# Patient Record
Sex: MALE | Race: WHITE | URBAN - METROPOLITAN AREA
[De-identification: names, ages, dates, MRNs, and addresses within clinical notes are randomized per-mention and may not be internally consistent; named-entity substitution may affect disease eponyms.]

---

## 2023-01-10 ENCOUNTER — OFFICE VISIT (OUTPATIENT)
Dept: ENDOCRINOLOGY | Age: 68
End: 2023-01-10
Payer: MEDICARE

## 2023-01-10 VITALS
HEIGHT: 74 IN | WEIGHT: 248 LBS | HEART RATE: 84 BPM | OXYGEN SATURATION: 95 % | DIASTOLIC BLOOD PRESSURE: 86 MMHG | SYSTOLIC BLOOD PRESSURE: 118 MMHG | BODY MASS INDEX: 31.83 KG/M2

## 2023-01-10 DIAGNOSIS — D35.2 PITUITARY MACROADENOMA WITH EXTRASELLAR EXTENSION (HCC): Primary | ICD-10-CM

## 2023-01-10 DIAGNOSIS — E22.1 HYPERPROLACTINEMIA (HCC): ICD-10-CM

## 2023-01-10 PROBLEM — I10 ESSENTIAL HYPERTENSION: Status: ACTIVE | Noted: 2023-01-10

## 2023-01-10 PROBLEM — E78.1 HYPERTRIGLYCERIDEMIA: Status: ACTIVE | Noted: 2023-01-10

## 2023-01-10 PROCEDURE — G8484 FLU IMMUNIZE NO ADMIN: HCPCS | Performed by: INTERNAL MEDICINE

## 2023-01-10 PROCEDURE — 1123F ACP DISCUSS/DSCN MKR DOCD: CPT | Performed by: INTERNAL MEDICINE

## 2023-01-10 PROCEDURE — 99204 OFFICE O/P NEW MOD 45 MIN: CPT | Performed by: INTERNAL MEDICINE

## 2023-01-10 PROCEDURE — 3017F COLORECTAL CA SCREEN DOC REV: CPT | Performed by: INTERNAL MEDICINE

## 2023-01-10 PROCEDURE — G8417 CALC BMI ABV UP PARAM F/U: HCPCS | Performed by: INTERNAL MEDICINE

## 2023-01-10 PROCEDURE — G8427 DOCREV CUR MEDS BY ELIG CLIN: HCPCS | Performed by: INTERNAL MEDICINE

## 2023-01-10 PROCEDURE — 3079F DIAST BP 80-89 MM HG: CPT | Performed by: INTERNAL MEDICINE

## 2023-01-10 PROCEDURE — 4004F PT TOBACCO SCREEN RCVD TLK: CPT | Performed by: INTERNAL MEDICINE

## 2023-01-10 PROCEDURE — 3074F SYST BP LT 130 MM HG: CPT | Performed by: INTERNAL MEDICINE

## 2023-01-10 RX ORDER — AZELASTINE 1 MG/ML
2 SPRAY, METERED NASAL 2 TIMES DAILY
COMMUNITY
Start: 2021-06-04

## 2023-01-10 RX ORDER — CLONIDINE HYDROCHLORIDE 0.1 MG/1
TABLET ORAL
COMMUNITY
Start: 2022-12-19

## 2023-01-10 RX ORDER — LORAZEPAM 1 MG/1
0.5 TABLET ORAL NIGHTLY
COMMUNITY
Start: 2022-09-01

## 2023-01-10 RX ORDER — HYDROCHLOROTHIAZIDE 25 MG/1
TABLET ORAL
COMMUNITY
Start: 2022-11-22

## 2023-01-10 RX ORDER — VITAMIN B COMPLEX
1 CAPSULE ORAL DAILY
COMMUNITY

## 2023-01-10 RX ORDER — TRAZODONE HYDROCHLORIDE 50 MG/1
TABLET ORAL
COMMUNITY
Start: 2022-11-22

## 2023-01-10 RX ORDER — AZITHROMYCIN 250 MG/1
TABLET, FILM COATED ORAL
COMMUNITY
Start: 2023-01-07

## 2023-01-10 RX ORDER — LEVOCETIRIZINE DIHYDROCHLORIDE 5 MG/1
5 TABLET, FILM COATED ORAL DAILY
COMMUNITY

## 2023-01-10 RX ORDER — ACETAMINOPHEN 325 MG/1
650 TABLET ORAL
COMMUNITY
Start: 2022-12-07

## 2023-01-10 RX ORDER — PRAVASTATIN SODIUM 40 MG
TABLET ORAL
COMMUNITY
Start: 2022-12-02

## 2023-01-10 RX ORDER — POLYETHYLENE GLYCOL 3350 17 G/17G
17 POWDER, FOR SOLUTION ORAL DAILY PRN
COMMUNITY
Start: 2022-12-07

## 2023-01-10 RX ORDER — NALOXONE HYDROCHLORIDE 4 MG/.1ML
SPRAY NASAL
COMMUNITY
Start: 2022-12-14

## 2023-01-10 RX ORDER — PHENOL 1.4 %
2.5 AEROSOL, SPRAY (ML) MUCOUS MEMBRANE DAILY
COMMUNITY

## 2023-01-10 RX ORDER — CEFDINIR 300 MG/1
CAPSULE ORAL
COMMUNITY
Start: 2023-01-07

## 2023-01-10 ASSESSMENT — ENCOUNTER SYMPTOMS
RHINORRHEA: 0
DIARRHEA: 0
CONSTIPATION: 0

## 2023-01-10 ASSESSMENT — PATIENT HEALTH QUESTIONNAIRE - PHQ9
SUM OF ALL RESPONSES TO PHQ QUESTIONS 1-9: 0
2. FEELING DOWN, DEPRESSED OR HOPELESS: 0
SUM OF ALL RESPONSES TO PHQ9 QUESTIONS 1 & 2: 0
SUM OF ALL RESPONSES TO PHQ QUESTIONS 1-9: 0
1. LITTLE INTEREST OR PLEASURE IN DOING THINGS: 0

## 2023-01-10 NOTE — PROGRESS NOTES
Elfego Tinsley MD, [address] [place] [address]            Reason for visit: Catie Mitchell is referred by Odilon Pimentel MD for the evaluation and management of a prolactinoma. ASSESSMENT AND PLAN:    1. Pituitary macroadenoma with extrasellar extension [facility])  Thank you for referring Mr. Yohana Palm for evaluation. He was initially referred to the [facility] endocrinology group back in September 2022 but cannot be seen in that office until May 2023. He self-referred to [address] and saw Dr. Randa Greene. He had mild hyperprolactinemia, almost certainly due to stalk effect (i.e. he almost certainly did not have a prolactinoma). He is now status post transsphenoidal pituitary resection 12/5/2022. He feels that he is recovered well and feels better since surgery. He believes that visual field deficits have resolved. I will have him check morning fasting labs at his convenience. He is scheduled for repeat MRI and visual field assessment. Return in 6 months.  - TSH; Future  - T4, Free; Future  - Prolactin; Future  - Insulin-Like Growth Factor; Future  - Testosterone Total Only, Male; Future  - Follicle Stimulating Hormone; Future  - Luteinizing Hormone; Future  - Cortisol, Baseline; Future  - ACTH; Future  - Comprehensive Metabolic Panel; Future    2. Hyperprolactinemia ([address]      Follow-up and Dispositions    Return in about 6 months (around 7/10/2023). History of Present Illness:    PITUITARY DISEASE  Catie Mitchell is here for new evaluation and treatment of a pituitary tumor. This was incidentally noted on MRI of the brain ordered as part of the evaluation of unilateral hearing loss. He has already been evaluated by Dr. Dann Stack (neurosurgeon) at [address] underwent transnasal/sublabral resection of the mass 12/5/2022. This was complicated by a CSF leak.     Symptoms: See review of systems below    Visual fields: Assessment by Dr. Logan Wilder before [facility] demonstrated a visual field deficit. He is scheduled for recheck 2023. Imagin2022: MRI brain with and without contrast ([facility])- 18 x 19 x 18 mm sellar and suprasellar mass displacing the posterior pituitary without evidence of extension in the sphenoid sinus. Tumor abuts the medial margin of the cavernous portion of the internal carotid arteries. There is mass affect upon the optic chiasm. 10/25/2022: MRI pituitary with and without contrast ([facility])- 21 x 19 x 18 mm sellar mass with mass-effect upon the optic chiasm and right with displacement of the infundibulum. Next MRI scheduled 2023 at [address]. Laboratory evaluation:  2020: TSH 1.109, free T4 0.76.  2021: TSH 1.326.  6/10/2022: TSH 0.893.  2022: Prolactin 16.5, IGF-I 73, human growth hormone less than 0.1, LH 2.6, FSH 3.4, cortisol 9.8, TSH 1.41, T4 7.4. Prior/current treatment:   Surgical: Transnasal/sublabral resection of the mass 2022 (Dr. Jessica García at [address])  Medical:   Prolactin: Cabergoline 0.5 mg twice weekly was started by Dr. Ana Blackburn 2022. This was stopped 2022. Adrenal: none  Thyroid: none  Gonadal: none  Growth hormone: none  ADH: none      Review of Systems   Constitutional:  Positive for unexpected weight change (lost ~10 pounds since TSPR 2022- he attributes to low appetite). Negative for fatigue. Libido is normal (improved since TSPR 2022). Mild erectile dysfunction treated with Cialis. HENT:  Negative for rhinorrhea. Eyes:  Negative for visual disturbance (improved). Cardiovascular:  Negative for palpitations. Gastrointestinal:  Negative for constipation and diarrhea. Endocrine: Negative for polydipsia and polyuria (urinates 4-5 times per day and 0-1 times overnight). Psychiatric/Behavioral:  Positive for agitation (increased irritability) and sleep disturbance. Negative for dysphoric mood. The patient is nervous/anxious.       /86 (Site: Left Upper Arm, Position: Sitting)   Pulse 84   Ht 6' 2\" (1.88 m)   Wt 248 lb (112.5 kg)   SpO2 95%   BMI 31.84 kg/m²   Wt Readings from Last 3 Encounters:   01/10/23 248 lb (112.5 kg)       Physical Exam  Constitutional:       Appearance: Normal appearance. HENT:      Head: Normocephalic. Neck:      Thyroid: No thyroid mass or thyromegaly. Cardiovascular:      Rate and Rhythm: Normal rate and regular rhythm. Pulmonary:      Effort: Pulmonary effort is normal.      Breath sounds: Normal breath sounds. Neurological:      Mental Status: He is alert.    Psychiatric:         Mood and Affect: Mood normal.         Behavior: Behavior normal.       Orders Placed This Encounter   Procedures    TSH     Standing Status:   Future     Standing Expiration Date:   1/10/2024    T4, Free     Standing Status:   Future     Standing Expiration Date:   1/10/2024    Prolactin     Standing Status:   Future     Standing Expiration Date:   1/10/2024    Insulin-Like Growth Factor     Standing Status:   Future     Standing Expiration Date:   1/10/2024    Testosterone Total Only, Male     Standing Status:   Future     Standing Expiration Date:   6/45/3258    Follicle Stimulating Hormone     Standing Status:   Future     Standing Expiration Date:   1/10/2024    Luteinizing Hormone     Standing Status:   Future     Standing Expiration Date:   1/10/2024    Cortisol, Baseline     Standing Status:   Future     Standing Expiration Date:   1/10/2024    ACTH     Standing Status:   Future     Standing Expiration Date:   1/10/2024    Comprehensive Metabolic Panel     Standing Status:   Future     Standing Expiration Date:   1/10/2024         Current Outpatient Medications   Medication Sig Dispense Refill    acetaminophen (TYLENOL) 325 MG tablet Take 650 mg by mouth      ascorbic acid (VITAMIN C) 1000 MG tablet Take by mouth      azelastine (ASTELIN) 0.1 % nasal spray 2 sprays by Nasal route 2 times daily      azithromycin (ZITHROMAX) 250 MG tablet       cloNIDine (CATAPRES) 0.1 MG tablet       Cholecalciferol (VITAMIN D3) 125 MCG (5000 UT) TABS Take 1 tablet by mouth daily      hydroCHLOROthiazide (HYDRODIURIL) 25 MG tablet       levocetirizine (XYZAL) 5 MG tablet Take 5 mg by mouth daily      LORazepam (ATIVAN) 1 MG tablet Take 0.5 mg by mouth nightly. naloxone 4 MG/0.1ML LIQD nasal spray       polyethylene glycol (GLYCOLAX) 17 GM/SCOOP powder Take 17 g by mouth daily as needed      pravastatin (PRAVACHOL) 40 MG tablet       traZODone (DESYREL) 50 MG tablet       silver sulfADIAZINE (SILVADENE) 1 % cream       Multiple Vitamins-Minerals (MULTIVITAMIN ADULTS PO) Take 1 tablet by mouth daily      b complex vitamins capsule Take 1 capsule by mouth daily      Melatonin 10 MG TABS Take 2.5 mg by mouth daily      cefdinir (OMNICEF) 300 MG capsule  (Patient not taking: Reported on 1/10/2023)       No current facility-administered medications for this visit. Linda Morocho MD, FACE      Portions of this note were generated with the assistance of voice recognition software. As such, some errors in transcription may be present.

## 2023-01-17 DIAGNOSIS — D35.2 PITUITARY MACROADENOMA WITH EXTRASELLAR EXTENSION (HCC): ICD-10-CM

## 2023-01-17 LAB
ALBUMIN SERPL-MCNC: 4.1 G/DL (ref 3.2–4.6)
ALBUMIN/GLOB SERPL: 1.2 (ref 0.4–1.6)
ALP SERPL-CCNC: 51 U/L (ref 50–136)
ALT SERPL-CCNC: 39 U/L (ref 12–65)
ANION GAP SERPL CALC-SCNC: 8 MMOL/L (ref 2–11)
AST SERPL-CCNC: 23 U/L (ref 15–37)
BILIRUB SERPL-MCNC: 1.2 MG/DL (ref 0.2–1.1)
BUN SERPL-MCNC: 14 MG/DL (ref 8–23)
CALCIUM SERPL-MCNC: 10.8 MG/DL (ref 8.3–10.4)
CHLORIDE SERPL-SCNC: 106 MMOL/L (ref 101–110)
CO2 SERPL-SCNC: 25 MMOL/L (ref 21–32)
CORTIS BS SERPL-MCNC: 21.5 UG/DL
CREAT SERPL-MCNC: 1.2 MG/DL (ref 0.8–1.5)
FSH SERPL-ACNC: 3.3 MIU/ML
GLOBULIN SER CALC-MCNC: 3.4 G/DL (ref 2.8–4.5)
GLUCOSE SERPL-MCNC: 112 MG/DL (ref 65–100)
LH SERPL-ACNC: 2 MIU/ML
POTASSIUM SERPL-SCNC: 4 MMOL/L (ref 3.5–5.1)
PROLACTIN SERPL-MCNC: 1.4 NG/ML
PROT SERPL-MCNC: 7.5 G/DL (ref 6.3–8.2)
SODIUM SERPL-SCNC: 139 MMOL/L (ref 133–143)
T4 FREE SERPL-MCNC: 1 NG/DL (ref 0.78–1.46)
TSH, 3RD GENERATION: 1.37 UIU/ML (ref 0.36–3.74)

## 2023-01-18 LAB
IGF-I SERPL-MCNC: 82 NG/ML (ref 59–230)
TESTOST SERPL-MCNC: 260 NG/DL (ref 264–916)

## 2023-01-19 DIAGNOSIS — D35.2 PITUITARY MACROADENOMA WITH EXTRASELLAR EXTENSION (HCC): Primary | ICD-10-CM

## 2023-01-19 DIAGNOSIS — E83.52 HYPERCALCEMIA: ICD-10-CM

## 2023-01-19 LAB — ACTH PLAS-MCNC: 74.2 PG/ML (ref 7.2–63.3)

## 2023-07-06 DIAGNOSIS — D35.2 PITUITARY MACROADENOMA WITH EXTRASELLAR EXTENSION (HCC): ICD-10-CM

## 2023-07-06 DIAGNOSIS — E83.52 HYPERCALCEMIA: ICD-10-CM

## 2023-07-06 LAB
25(OH)D3 SERPL-MCNC: 53.4 NG/ML (ref 30–100)
ALBUMIN SERPL-MCNC: 3.7 G/DL (ref 3.2–4.6)
ALBUMIN/GLOB SERPL: 1 (ref 0.4–1.6)
ALP SERPL-CCNC: 50 U/L (ref 50–136)
ALT SERPL-CCNC: 29 U/L (ref 12–65)
ANION GAP SERPL CALC-SCNC: 2 MMOL/L (ref 2–11)
AST SERPL-CCNC: 21 U/L (ref 15–37)
BILIRUB SERPL-MCNC: 0.9 MG/DL (ref 0.2–1.1)
BUN SERPL-MCNC: 16 MG/DL (ref 8–23)
CALCIUM SERPL-MCNC: 9.4 MG/DL (ref 8.3–10.4)
CALCIUM SERPL-MCNC: 9.6 MG/DL (ref 8.3–10.4)
CHLORIDE SERPL-SCNC: 109 MMOL/L (ref 101–110)
CO2 SERPL-SCNC: 26 MMOL/L (ref 21–32)
CREAT SERPL-MCNC: 1.2 MG/DL (ref 0.8–1.5)
GLOBULIN SER CALC-MCNC: 3.6 G/DL (ref 2.8–4.5)
GLUCOSE SERPL-MCNC: 108 MG/DL (ref 65–100)
POTASSIUM SERPL-SCNC: 3.8 MMOL/L (ref 3.5–5.1)
PROT SERPL-MCNC: 7.3 G/DL (ref 6.3–8.2)
PTH-INTACT SERPL-MCNC: 44 PG/ML (ref 18.5–88)
SODIUM SERPL-SCNC: 137 MMOL/L (ref 133–143)
T4 FREE SERPL-MCNC: 1 NG/DL (ref 0.78–1.46)
TSH, 3RD GENERATION: 1.62 UIU/ML (ref 0.36–3.74)

## 2023-07-14 ENCOUNTER — OFFICE VISIT (OUTPATIENT)
Dept: ENDOCRINOLOGY | Age: 68
End: 2023-07-14
Payer: MEDICARE

## 2023-07-14 VITALS
BODY MASS INDEX: 32.1 KG/M2 | WEIGHT: 250 LBS | OXYGEN SATURATION: 96 % | SYSTOLIC BLOOD PRESSURE: 112 MMHG | DIASTOLIC BLOOD PRESSURE: 63 MMHG | HEART RATE: 60 BPM

## 2023-07-14 DIAGNOSIS — R45.1 AGITATION: ICD-10-CM

## 2023-07-14 DIAGNOSIS — D35.2 PITUITARY MACROADENOMA WITH EXTRASELLAR EXTENSION (HCC): Primary | ICD-10-CM

## 2023-07-14 DIAGNOSIS — Z86.39 HISTORY OF HYPERPROLACTINEMIA: ICD-10-CM

## 2023-07-14 PROCEDURE — G8417 CALC BMI ABV UP PARAM F/U: HCPCS | Performed by: INTERNAL MEDICINE

## 2023-07-14 PROCEDURE — 3078F DIAST BP <80 MM HG: CPT | Performed by: INTERNAL MEDICINE

## 2023-07-14 PROCEDURE — 99214 OFFICE O/P EST MOD 30 MIN: CPT | Performed by: INTERNAL MEDICINE

## 2023-07-14 PROCEDURE — G8428 CUR MEDS NOT DOCUMENT: HCPCS | Performed by: INTERNAL MEDICINE

## 2023-07-14 PROCEDURE — 1123F ACP DISCUSS/DSCN MKR DOCD: CPT | Performed by: INTERNAL MEDICINE

## 2023-07-14 PROCEDURE — 3017F COLORECTAL CA SCREEN DOC REV: CPT | Performed by: INTERNAL MEDICINE

## 2023-07-14 PROCEDURE — 4004F PT TOBACCO SCREEN RCVD TLK: CPT | Performed by: INTERNAL MEDICINE

## 2023-07-14 PROCEDURE — 3074F SYST BP LT 130 MM HG: CPT | Performed by: INTERNAL MEDICINE

## 2023-07-14 NOTE — PROGRESS NOTES
Talia Sanches MD, [facility]            Reason for visit: Follow-up of a prolactinoma. ASSESSMENT AND PLAN:    1. Pituitary macroadenoma with extrasellar extension [facility])  Mr. Shawn Maurer is now status post transsphenoidal pituitary resection 2022. He feels that he is recovered well and feels better since surgery. He believes that visual field deficits have resolved (I have not received any reports about that). He had a mildly low testosterone (which is probably normal for his age) but no other hormonal perturbations after surgery. I will plan to follow labs as below. Return in 6 months with repeat labs. - Testosterone Total Only, Male; Future  - Comprehensive Metabolic Panel; Future  - Luteinizing Hormone; Future  - Follicle Stimulating Hormone; Future  - TSH; Future  - T4, Free; Future  - Insulin-Like Growth Factor; Future  - Cortisol, Baseline; Future    2. History of hyperprolactinemia    3. Agitation  He reports being more agitated recently. This is very unlikely to be due to an hormonal problem. I asked him to make sure that Dr. Jennie Aguilar is aware. Follow-up and Dispositions    Return in about 6 months (around 2024). History of Present Illness:    PITUITARY DISEASE  Gabriela Muñoz is here for new evaluation and treatment of a pituitary tumor. This was incidentally noted on MRI of the brain ordered as part of the evaluation of unilateral hearing loss. He has already been evaluated by Dr. Eleuterio Trotter (neurosurgeon) at [address] underwent transnasal/sublabral resection of the mass 2022. This was complicated by a CSF leak. Symptoms: See review of systems below    Visual fields: Assessment by Dr. Yuly Mazariegos before TSPR demonstrated a visual field deficit.   Assessments since then Joel Philippe and ~May 2023) have been normal.    Imagin2022: MRI brain with and without contrast ([facility])- 18 x 19 x 18 mm sellar and

## 2024-01-08 DIAGNOSIS — D35.2 PITUITARY MACROADENOMA WITH EXTRASELLAR EXTENSION (HCC): ICD-10-CM

## 2024-01-08 LAB
ALBUMIN SERPL-MCNC: 4.1 G/DL (ref 3.2–4.6)
ALBUMIN/GLOB SERPL: 1.2 (ref 0.4–1.6)
ALP SERPL-CCNC: 48 U/L (ref 50–136)
ALT SERPL-CCNC: 30 U/L (ref 12–65)
ANION GAP SERPL CALC-SCNC: 6 MMOL/L (ref 2–11)
AST SERPL-CCNC: 20 U/L (ref 15–37)
BILIRUB SERPL-MCNC: 1.3 MG/DL (ref 0.2–1.1)
BUN SERPL-MCNC: 23 MG/DL (ref 8–23)
CALCIUM SERPL-MCNC: 9.4 MG/DL (ref 8.3–10.4)
CHLORIDE SERPL-SCNC: 107 MMOL/L (ref 103–113)
CO2 SERPL-SCNC: 27 MMOL/L (ref 21–32)
CORTIS BS SERPL-MCNC: 19.8 UG/DL
CREAT SERPL-MCNC: 1.2 MG/DL (ref 0.8–1.5)
FSH SERPL-ACNC: 3.7 MIU/ML
GLOBULIN SER CALC-MCNC: 3.4 G/DL (ref 2.8–4.5)
GLUCOSE SERPL-MCNC: 108 MG/DL (ref 65–100)
LH SERPL-ACNC: 2.2 MIU/ML
POTASSIUM SERPL-SCNC: 4 MMOL/L (ref 3.5–5.1)
PROT SERPL-MCNC: 7.5 G/DL (ref 6.3–8.2)
SODIUM SERPL-SCNC: 140 MMOL/L (ref 136–146)
T4 FREE SERPL-MCNC: 1 NG/DL (ref 0.78–1.46)
TSH, 3RD GENERATION: 1.96 UIU/ML (ref 0.36–3.74)

## 2024-01-09 LAB — IGF-I SERPL-MCNC: 68 NG/ML (ref 59–230)

## 2024-01-10 LAB — TESTOST SERPL-MCNC: 285 NG/DL (ref 264–916)

## 2024-01-16 ENCOUNTER — OFFICE VISIT (OUTPATIENT)
Dept: ENDOCRINOLOGY | Age: 69
End: 2024-01-16
Payer: MEDICARE

## 2024-01-16 VITALS
HEART RATE: 59 BPM | OXYGEN SATURATION: 95 % | SYSTOLIC BLOOD PRESSURE: 112 MMHG | HEIGHT: 74 IN | DIASTOLIC BLOOD PRESSURE: 74 MMHG | BODY MASS INDEX: 32.29 KG/M2 | WEIGHT: 251.6 LBS

## 2024-01-16 DIAGNOSIS — R73.01 FASTING HYPERGLYCEMIA: ICD-10-CM

## 2024-01-16 DIAGNOSIS — D35.2 PITUITARY MACROADENOMA WITH EXTRASELLAR EXTENSION (HCC): Primary | ICD-10-CM

## 2024-01-16 DIAGNOSIS — Z86.39 HISTORY OF HYPERPROLACTINEMIA: ICD-10-CM

## 2024-01-16 PROCEDURE — 3017F COLORECTAL CA SCREEN DOC REV: CPT | Performed by: INTERNAL MEDICINE

## 2024-01-16 PROCEDURE — 3078F DIAST BP <80 MM HG: CPT | Performed by: INTERNAL MEDICINE

## 2024-01-16 PROCEDURE — G2211 COMPLEX E/M VISIT ADD ON: HCPCS | Performed by: INTERNAL MEDICINE

## 2024-01-16 PROCEDURE — G8484 FLU IMMUNIZE NO ADMIN: HCPCS | Performed by: INTERNAL MEDICINE

## 2024-01-16 PROCEDURE — G8417 CALC BMI ABV UP PARAM F/U: HCPCS | Performed by: INTERNAL MEDICINE

## 2024-01-16 PROCEDURE — G8427 DOCREV CUR MEDS BY ELIG CLIN: HCPCS | Performed by: INTERNAL MEDICINE

## 2024-01-16 PROCEDURE — 1123F ACP DISCUSS/DSCN MKR DOCD: CPT | Performed by: INTERNAL MEDICINE

## 2024-01-16 PROCEDURE — 4004F PT TOBACCO SCREEN RCVD TLK: CPT | Performed by: INTERNAL MEDICINE

## 2024-01-16 PROCEDURE — 3074F SYST BP LT 130 MM HG: CPT | Performed by: INTERNAL MEDICINE

## 2024-01-16 PROCEDURE — 99214 OFFICE O/P EST MOD 30 MIN: CPT | Performed by: INTERNAL MEDICINE

## 2024-01-16 NOTE — PROGRESS NOTES
normal.    Imagin2022: MRI brain with and without contrast ([facility])- 18 x 19 x 18 mm sellar and suprasellar mass displacing the posterior pituitary without evidence of extension in the sphenoid sinus.  Tumor abuts the medial margin of the cavernous portion of the internal carotid arteries.  There is mass affect upon the optic chiasm.    10/25/2022: MRI pituitary with and without contrast ([facility])- 21 x 19 x 18 mm sellar mass with mass-effect upon the optic chiasm and right with displacement of the infundibulum.    [place]23[place] [place] region of intrinsic T1 hyperintensity within the pituitary resection bed without residual tumor.  Infundibulum is deviated to the right.  Decreased mass effect upon the optic chiasm.    Laboratory evaluation:  2020: TSH 1.109, free T4 0.76.  2021: TSH 1.326.  6/10/2022: TSH 0.893.  2022: Prolactin 16.5, IGF-I 73, human growth hormone less than 0.1, LH 2.6, FSH 3.4, cortisol 9.8, TSH 1.41, T4 7.4.  2023 at 7:14 AM: Prolactin 1.4, IGF-I 82, testosterone 216, LH 2.0, FSH 3.3, cortisol 21.5, ACTH 74.2, TSH 1.370, free T4 1.0, calcium 10.8.  2023 at 7:53 AM: TSH 1.620, free T4 1.0, calcium 9.4, intact parathyroid hormone 44.0, 25-hydroxy vitamin D 53.4.  2024 at 7:28 AM: IGF-I 68, testosterone 285, LH 2.2, FSH 3.7, cortisol 19.8, TSH 1.960, free T4 1.0.    Prior/current treatment:   Surgical: [place]nasal[place]ral [place]22 [place] [facility] [place]  Medical:   Prolactin: Cabergoline 0.5 mg twice weekly was started by Dr. Castellano 2022.  This was stopped 2022.  Adrenal: none  Thyroid: none  Gonadal: none  Growth hormone: none  ADH: none      Review of Systems   Constitutional:  Negative for fatigue (resolved) and unexpected weight change (stable over 12+ months).   Eyes:  Negative for visual disturbance.   Endocrine: Negative for polyuria.   Psychiatric/Behavioral:  Negative for sleep